# Patient Record
Sex: MALE | Race: WHITE | NOT HISPANIC OR LATINO | Employment: OTHER | URBAN - METROPOLITAN AREA
[De-identification: names, ages, dates, MRNs, and addresses within clinical notes are randomized per-mention and may not be internally consistent; named-entity substitution may affect disease eponyms.]

---

## 2023-03-06 PROBLEM — N52.9 MALE ERECTILE DISORDER: Status: ACTIVE | Noted: 2023-03-06

## 2023-03-06 PROBLEM — F32.A DEPRESSION: Status: ACTIVE | Noted: 2023-03-06

## 2023-03-06 PROBLEM — L72.3 SEBACEOUS CYST: Status: ACTIVE | Noted: 2023-03-06

## 2023-03-06 PROBLEM — D00.07 CARCINOMA IN SITU OF TONGUE: Status: ACTIVE | Noted: 2023-03-06

## 2023-03-06 PROBLEM — S99.922S FOOT INJURY, LEFT, SEQUELA: Status: ACTIVE | Noted: 2023-03-06

## 2023-03-06 PROBLEM — M25.562 KNEE PAIN, LEFT: Status: ACTIVE | Noted: 2023-03-06

## 2023-03-06 PROBLEM — S76.011A STRAIN OF RIGHT HIP: Status: ACTIVE | Noted: 2023-03-06

## 2023-03-06 PROBLEM — G47.9 SLEEP DISORDER: Status: ACTIVE | Noted: 2023-03-06

## 2023-03-06 PROBLEM — K13.4: Status: ACTIVE | Noted: 2023-03-06

## 2023-03-06 PROBLEM — E66.3 OVERWEIGHT: Status: ACTIVE | Noted: 2023-03-06

## 2023-03-06 PROBLEM — F90.9 ADHD (ATTENTION DEFICIT HYPERACTIVITY DISORDER): Status: ACTIVE | Noted: 2023-03-06

## 2023-03-06 PROBLEM — M25.559 JOINT PAIN, HIP: Status: ACTIVE | Noted: 2023-03-06

## 2023-03-06 PROBLEM — L82.1 SEBORRHEIC KERATOSES: Status: ACTIVE | Noted: 2023-03-06

## 2023-03-06 PROBLEM — K13.21 ORAL LEUKOPLAKIA: Status: ACTIVE | Noted: 2023-03-06

## 2023-03-06 PROBLEM — R03.0 BLOOD PRESSURE ELEVATED WITHOUT HISTORY OF HTN: Status: ACTIVE | Noted: 2023-03-06

## 2023-03-06 PROBLEM — M16.10 PRIMARY LOCALIZED OSTEOARTHROSIS OF THE HIP: Status: ACTIVE | Noted: 2023-03-06

## 2023-03-06 PROBLEM — S83.249A MEDIAL MENISCUS TEAR: Status: ACTIVE | Noted: 2023-03-06

## 2023-03-06 PROBLEM — E78.49 ESSENTIAL FAMILIAL HYPERLIPIDEMIA: Status: ACTIVE | Noted: 2023-03-06

## 2023-03-06 PROBLEM — M23.8X2 OTHER INTERNAL DERANGEMENTS OF LEFT KNEE: Status: ACTIVE | Noted: 2023-03-06

## 2023-03-06 PROBLEM — M25.462 EFFUSION OF LEFT KNEE: Status: ACTIVE | Noted: 2023-03-06

## 2023-03-06 PROBLEM — G47.00 INSOMNIA: Status: ACTIVE | Noted: 2023-03-06

## 2023-03-06 PROBLEM — S93.402A LEFT ANKLE SPRAIN: Status: ACTIVE | Noted: 2023-03-06

## 2023-03-06 PROBLEM — N40.0 BPH (BENIGN PROSTATIC HYPERPLASIA): Status: ACTIVE | Noted: 2023-03-06

## 2023-03-06 PROBLEM — M17.12 ARTHRITIS OF KNEE, LEFT: Status: ACTIVE | Noted: 2023-03-06

## 2023-03-06 RX ORDER — POLYETHYLENE GLYCOL 3350, SODIUM CHLORIDE, SODIUM BICARBONATE, POTASSIUM CHLORIDE 420; 11.2; 5.72; 1.48 G/4L; G/4L; G/4L; G/4L
POWDER, FOR SOLUTION ORAL
COMMUNITY
Start: 2022-01-28

## 2023-03-06 RX ORDER — VALACYCLOVIR HYDROCHLORIDE 1 G/1
1 TABLET, FILM COATED ORAL DAILY
COMMUNITY
Start: 2020-03-03

## 2023-03-06 RX ORDER — CLOTRIMAZOLE 10 MG/1
LOZENGE ORAL; TOPICAL
COMMUNITY
Start: 2016-05-09

## 2023-03-06 RX ORDER — DOCUSATE SODIUM 100 MG/1
1 CAPSULE, LIQUID FILLED ORAL 2 TIMES DAILY
COMMUNITY
Start: 2016-07-11

## 2023-03-06 RX ORDER — DOXEPIN HYDROCHLORIDE 10 MG/ML
5 SOLUTION ORAL NIGHTLY
COMMUNITY
Start: 2015-07-01

## 2023-03-06 RX ORDER — CHLORHEXIDINE GLUCONATE ORAL RINSE 1.2 MG/ML
SOLUTION DENTAL
COMMUNITY
Start: 2016-07-23

## 2023-03-06 RX ORDER — METFORMIN HYDROCHLORIDE 500 MG/1
1 TABLET ORAL DAILY
COMMUNITY
Start: 2021-01-28

## 2023-03-06 RX ORDER — MULTIVITAMIN
TABLET ORAL
COMMUNITY
Start: 2016-03-02

## 2023-03-06 RX ORDER — MELOXICAM 15 MG/1
TABLET ORAL
COMMUNITY
Start: 2020-03-03

## 2023-03-06 RX ORDER — DEXTROAMPHETAMINE SACCHARATE, AMPHETAMINE ASPARTATE MONOHYDRATE, DEXTROAMPHETAMINE SULFATE, AMPHETAMINE SULFATE 6.25; 6.25; 6.25; 6.25 MG/1; MG/1; MG/1; MG/1
25 CAPSULE, EXTENDED RELEASE ORAL DAILY
COMMUNITY
Start: 2017-12-19

## 2023-03-06 RX ORDER — ASPIRIN 81 MG/1
81 TABLET ORAL
COMMUNITY
Start: 2013-11-18

## 2023-03-06 RX ORDER — LIDOCAINE HYDROCHLORIDE 20 MG/ML
SOLUTION OROPHARYNGEAL
COMMUNITY
Start: 2016-07-23

## 2023-03-08 ENCOUNTER — LAB (OUTPATIENT)
Dept: LAB | Facility: LAB | Age: 73
End: 2023-03-08
Payer: MEDICARE

## 2023-03-08 ENCOUNTER — OFFICE VISIT (OUTPATIENT)
Dept: PRIMARY CARE | Facility: CLINIC | Age: 73
End: 2023-03-08
Payer: MEDICARE

## 2023-03-08 VITALS — WEIGHT: 184 LBS | SYSTOLIC BLOOD PRESSURE: 123 MMHG | DIASTOLIC BLOOD PRESSURE: 69 MMHG | BODY MASS INDEX: 26.4 KG/M2

## 2023-03-08 DIAGNOSIS — F51.01 PRIMARY INSOMNIA: ICD-10-CM

## 2023-03-08 DIAGNOSIS — N40.0 BENIGN PROSTATIC HYPERPLASIA WITHOUT LOWER URINARY TRACT SYMPTOMS: ICD-10-CM

## 2023-03-08 DIAGNOSIS — Z00.00 HEALTH CARE MAINTENANCE: Primary | ICD-10-CM

## 2023-03-08 DIAGNOSIS — G89.29 CHRONIC LEFT SHOULDER PAIN: ICD-10-CM

## 2023-03-08 DIAGNOSIS — E78.49 ESSENTIAL FAMILIAL HYPERLIPIDEMIA: ICD-10-CM

## 2023-03-08 DIAGNOSIS — M25.512 CHRONIC LEFT SHOULDER PAIN: ICD-10-CM

## 2023-03-08 DIAGNOSIS — Z12.11 ENCOUNTER FOR SCREENING FOR MALIGNANT NEOPLASM OF COLON: ICD-10-CM

## 2023-03-08 DIAGNOSIS — Z00.00 HEALTH CARE MAINTENANCE: ICD-10-CM

## 2023-03-08 LAB
ALANINE AMINOTRANSFERASE (SGPT) (U/L) IN SER/PLAS: 38 U/L (ref 10–52)
ALBUMIN (G/DL) IN SER/PLAS: 4.3 G/DL (ref 3.4–5)
ALKALINE PHOSPHATASE (U/L) IN SER/PLAS: 56 U/L (ref 33–136)
ANION GAP IN SER/PLAS: 13 MMOL/L (ref 10–20)
ASPARTATE AMINOTRANSFERASE (SGOT) (U/L) IN SER/PLAS: 26 U/L (ref 9–39)
BILIRUBIN TOTAL (MG/DL) IN SER/PLAS: 0.4 MG/DL (ref 0–1.2)
CALCIUM (MG/DL) IN SER/PLAS: 9.2 MG/DL (ref 8.6–10.6)
CARBON DIOXIDE, TOTAL (MMOL/L) IN SER/PLAS: 27 MMOL/L (ref 21–32)
CHLORIDE (MMOL/L) IN SER/PLAS: 107 MMOL/L (ref 98–107)
CHOLESTEROL (MG/DL) IN SER/PLAS: 186 MG/DL (ref 0–199)
CHOLESTEROL IN HDL (MG/DL) IN SER/PLAS: 34.6 MG/DL
CHOLESTEROL/HDL RATIO: 5.4
CREATININE (MG/DL) IN SER/PLAS: 0.9 MG/DL (ref 0.5–1.3)
ERYTHROCYTE DISTRIBUTION WIDTH (RATIO) BY AUTOMATED COUNT: 12.7 % (ref 11.5–14.5)
ERYTHROCYTE MEAN CORPUSCULAR HEMOGLOBIN CONCENTRATION (G/DL) BY AUTOMATED: 33.2 G/DL (ref 32–36)
ERYTHROCYTE MEAN CORPUSCULAR VOLUME (FL) BY AUTOMATED COUNT: 91 FL (ref 80–100)
ERYTHROCYTES (10*6/UL) IN BLOOD BY AUTOMATED COUNT: 4.97 X10E12/L (ref 4.5–5.9)
GFR MALE: >90 ML/MIN/1.73M2
GLUCOSE (MG/DL) IN SER/PLAS: 87 MG/DL (ref 74–99)
HEMATOCRIT (%) IN BLOOD BY AUTOMATED COUNT: 45.2 % (ref 41–52)
HEMOGLOBIN (G/DL) IN BLOOD: 15 G/DL (ref 13.5–17.5)
LDL: 109 MG/DL (ref 0–99)
LEUKOCYTES (10*3/UL) IN BLOOD BY AUTOMATED COUNT: 5.2 X10E9/L (ref 4.4–11.3)
NON HDL CHOLESTEROL: 151 MG/DL
NRBC (PER 100 WBCS) BY AUTOMATED COUNT: 0 /100 WBC (ref 0–0)
PLATELETS (10*3/UL) IN BLOOD AUTOMATED COUNT: 223 X10E9/L (ref 150–450)
POTASSIUM (MMOL/L) IN SER/PLAS: 4.6 MMOL/L (ref 3.5–5.3)
PROSTATE SPECIFIC ANTIGEN,SCREEN: 1.58 NG/ML (ref 0–4)
PROTEIN TOTAL: 6.6 G/DL (ref 6.4–8.2)
SODIUM (MMOL/L) IN SER/PLAS: 142 MMOL/L (ref 136–145)
TRIGLYCERIDE (MG/DL) IN SER/PLAS: 210 MG/DL (ref 0–149)
UREA NITROGEN (MG/DL) IN SER/PLAS: 23 MG/DL (ref 6–23)
VLDL: 42 MG/DL (ref 0–40)

## 2023-03-08 PROCEDURE — G0439 PPPS, SUBSEQ VISIT: HCPCS | Performed by: INTERNAL MEDICINE

## 2023-03-08 PROCEDURE — 85027 COMPLETE CBC AUTOMATED: CPT

## 2023-03-08 PROCEDURE — 1036F TOBACCO NON-USER: CPT | Performed by: INTERNAL MEDICINE

## 2023-03-08 PROCEDURE — 99214 OFFICE O/P EST MOD 30 MIN: CPT | Performed by: INTERNAL MEDICINE

## 2023-03-08 PROCEDURE — 93000 ELECTROCARDIOGRAM COMPLETE: CPT | Performed by: INTERNAL MEDICINE

## 2023-03-08 PROCEDURE — 80053 COMPREHEN METABOLIC PANEL: CPT

## 2023-03-08 PROCEDURE — 80061 LIPID PANEL: CPT

## 2023-03-08 PROCEDURE — 84153 ASSAY OF PSA TOTAL: CPT

## 2023-03-08 PROCEDURE — 36415 COLL VENOUS BLD VENIPUNCTURE: CPT

## 2023-03-08 NOTE — PROGRESS NOTES
Subjective   Patient ID: Nelson Hope is a 72 y.o. male who presents for No chief complaint on file..    HPI CPE see updated front sheet no chest pain no shortness of breath no side effect with medication uses a small amount of doxepin for sleeping at night discussed with prostate health discussed with: Health Brother had recent diagnosis of low-grade prostate cancer some left shoulder pain bowels normal no dysuria bones muscles joints otherwise okay    Medicare wellness: There is no preformed questionnaire available tobacco assessed alcohol assessed drug use cyst no reports of cognitive impairment ADLs all independent IADLs all independent exercises patient care team noted no falls not depressed patient screen no hearing aids      Past medical history insomnia hyperlipidemia    Medication doxepin    Allergies noted and unchanged    Social history no tobacco    Family history Brother prostate cancer no colon cancer in the family    Prevention exercises some prior blood work reviewed    Depression screen not depressed    Review of Systems    Objective   There were no vitals taken for this visit.    Physical Exam vital signs noted alert and oriented x3 NCAT PERRLA EOMI nares without discharge OP benign TM normal bilateral EAC clear bilateral no AC nodes no JVD or bruit no thyromegaly chest clear to auscultation and percussion CV regular rate and rhythm S1-S2 without murmur gallop or rub abdomen soft nontender normal active bowel sounds no rebound or guarding no HSM LS spine normal curvature negative straight leg raise negative logroll negative SI joint tenderness extremities no clubbing cyanosis or edema normal distal pulses DTR 2+ skin small lipoma left scapula    Assessment/Plan        Impression General medical examination hyperlipidemia left shoulder pain BPH other diagnoses insomnia  Plan continue with doxepin and sleep hygiene left shoulder with full range of motion no crepitus desires to have physical therapy  okay requisition made check EKG rule out LVH advised on blood pressure blood pressure medicine although blood pressure is okay today check Chem-7 advised on glucose potassium and kidney function check CBC advised on blood count check lipid panel advised on cholesterol profile okay for colonoscopy requisition made check PSA advised on prostate good diet regular exercise increase water consumption water consumption evaluation towards the evening time recheck based on above TT 60 cc 31

## 2024-01-26 ENCOUNTER — TELEPHONE (OUTPATIENT)
Dept: PRIMARY CARE | Facility: CLINIC | Age: 74
End: 2024-01-26

## 2024-01-27 DIAGNOSIS — H91.90 HEARING LOSS, UNSPECIFIED HEARING LOSS TYPE, UNSPECIFIED LATERALITY: Primary | ICD-10-CM

## 2024-02-22 ENCOUNTER — TELEPHONE (OUTPATIENT)
Dept: ORTHOPEDIC SURGERY | Facility: CLINIC | Age: 74
End: 2024-02-22
Payer: MEDICARE

## 2024-02-22 DIAGNOSIS — G89.29 CHRONIC PAIN OF RIGHT KNEE: Primary | ICD-10-CM

## 2024-02-22 DIAGNOSIS — M25.561 CHRONIC PAIN OF RIGHT KNEE: Primary | ICD-10-CM

## 2024-02-22 DIAGNOSIS — S86.111S GASTROCNEMIUS STRAIN, RIGHT, SEQUELA: ICD-10-CM

## 2024-02-22 NOTE — TELEPHONE ENCOUNTER
pt of dr Staton is requesting Physical Therapy order for rt knee pt stated still having pain, 2-22 sm

## 2024-02-28 ENCOUNTER — CLINICAL SUPPORT (OUTPATIENT)
Dept: AUDIOLOGY | Facility: CLINIC | Age: 74
End: 2024-02-28
Payer: MEDICARE

## 2024-02-28 DIAGNOSIS — H90.3 ASYMMETRIC SNHL (SENSORINEURAL HEARING LOSS): ICD-10-CM

## 2024-02-28 PROCEDURE — 92550 TYMPANOMETRY & REFLEX THRESH: CPT | Performed by: AUDIOLOGIST

## 2024-02-28 PROCEDURE — 92557 COMPREHENSIVE HEARING TEST: CPT | Performed by: AUDIOLOGIST

## 2024-02-28 NOTE — PROGRESS NOTES
"  ADULT AUDIOMETRIC EVALUATION    Name:  Nelson Hope  :  1950  Age:  73 y.o.  Date of Evaluation:  2024    HISTORY:  Reason for visit: Mr. Hope is seen today for an evaluation of hearing. Patient was unaccompanied. He was referred by Duc Haney MD.    Patient reports concern for hearing decrease, left ear greater than right ear. He reports his wife is noticing as well. Occasionally, he will experience sounds of hearing his breathing in the left ear which he finds to be annoying. He has history of some recreational noise exposure (loud music, concerts).     Denies: history of otologic surgery, otalgia, otorrhea, and tinnitus    Hearing Aid History: Patient does not have hearing aids at this time.    EVALUATION:    See Audiogram and Immittance results under \"Media\".    RESULTS:     Otoscopic Evaluation:     RIGHT  Clear canal with tympanic membrane visualized    LEFT  Clear canal with tympanic membrane visualized    Immittance:   Immittance Measures: 226 Hz          Right Ear: Type A: Normal middle ear function         Left Ear:  Type A: Normal middle ear function    Reflexes and Reflex Decay:    Ipsilateral Reflexes (500-4000 Hz):          Probe/Stimulus Right Ear: present       Probe/Stimulus Left Ear: present 1000 and 2000 Hz; artifact at 500 and 4000 Hz    Otoacoustic Emissions [DP(OAEs)]:  Right Ear: DPOAEs present at 5973-7961 Hz, 5000 Hz. Absent at 4685-5649 Hz, 6170-5561 Hz.   Left Ear: DPOAEs present at 4616-0699 Hz, 5000 Hz and 8000 Hz. Absent at 4000 and 6000 Hz.         Audiometry:  Test Technique: Standard Audiometry under insert phones.    Reliability: Good     Pure Tone Audiometry:    Right: Hearing levels within normal limits 125-1500 Hz falling to a mild sloping to moderately-severe sensorineural hearing loss 2795-0413 Hz   Left: Hearing levels within normal limits 125-1000 Hz falling to a mild sloping to severe sensorineural hearing loss 2918-8333 Hz   Asymmetry noted, " left ear worse, from 3672-8050 Hz.    Speech Audiometry (via recorded, 25-words unless noted; M=masked):       Right Ear: Speech Reception Threshold (SRT) was obtained at 10 dBHL  Word Recognition Scores were Good (84%) in quiet when words were presented at 50 dBHL, using the NU-6 2A word list.  Left Ear: Speech Reception Threshold (SRT) was obtained at 20 dBHL  Word Recognition Scores were Good (80%) in quiet when words were presented at 60 dBHL, using the NU-6 3A word list.    IMPRESSIONS:  Today's test results suggest normal middle ear function.     Right: Hearing levels within normal limits 125-1500 Hz falling to a mild sloping to moderately-severe sensorineural hearing loss 5677-6228 Hz with good word understanding.   Left: Hearing levels within normal limits 125-1000 Hz falling to a mild sloping to severe sensorineural hearing loss 9117-1860 Hz with good word understanding.   Asymmetry noted, left ear worse, from 0596-2120 Hz.    PATIENT EDUCATION:   Nelson Hope was counseled with regard to the findings. Evaluation with ENT was recommended due to asymmetry at 8178-6588 Hz, left ear worse. Patient reports interest in hearing aids.       PLAN:  Follow up with Duc Haney MD as directed.  Retest hearing annually; sooner if concerns or changes arise.  Flex Trial scheduled for 3/13/24 at 3:00. Patient signed the Waiver of Medical Evaluation for Hearing Aid.      Delon Jones, CCC-A  Clinical Audiologist    Time: 0464-5333    This note was created using speech recognition transcription software. Despite proofreading, several typographical errors might be present that might affect the meaning of the content. Please call with any questions.     Degree of   Hearing Sensitivity dB Range   Within Normal Limits (WNL) 0 - 20   Slight 25   Mild 26 - 40   Moderate 41 - 55   Moderately-Severe 56 - 70   Severe 71 - 90   Profound 91 +     KEY  TM Tympanic Membrane   WNL Within Normal Limits   HA Hearing Aid   SNHL  Sensorineural Hearing Loss   CHL Conductive Hearing Loss   NIHL Noise-Induced Hearing Loss   ECV Ear Canal Volume

## 2024-03-13 ENCOUNTER — CLINICAL SUPPORT (OUTPATIENT)
Dept: AUDIOLOGY | Facility: CLINIC | Age: 74
End: 2024-03-13

## 2024-03-13 DIAGNOSIS — H90.3 ASYMMETRIC SNHL (SENSORINEURAL HEARING LOSS): Primary | ICD-10-CM

## 2024-03-13 PROCEDURE — 92700 UNLISTED ORL SERVICE/PX: CPT | Performed by: AUDIOLOGIST

## 2024-03-13 NOTE — PROGRESS NOTES
"FLEX TRIAL HEARING AID FITTING    Name:  Nelson Hope  :  1950  Age:  73 y.o.    Date of Encounter:  3/13/2024  Time: 3955-5133    HISTORY     Mr. Hope is seen today for a fitting of Flex Trial binaural Phonak Audeo L90-R hearing aids. Most recent hearing evaluation on 2024 revealed normal hearing sloping to a moderately-severe sensorineural hearing loss in the right ear and normal hearing sloping to a severe sensorineural hearing loss in the left ear. Asymmetry noted, left ear worse, from 0813-8747 Hz. Patient signed the Waiver of Medical Evaluation for Hearing Aid.      IMPRESSIONS AND RECOMMENDATIONS      Fitting of binaural Flex Trial binaural Phonak Audeo L90-R hearing aids was completed today. The patient expressed satisfaction with the fit and sound of the hearing aids. The trial device agreement was reviewed and signed (see scanned documents in \"Media\" tab). Patient was counseled regarding the conditions of the Flex Trial period. Questions regarding hearing aid technology levels were addressed and the patient was encouraged to contact our department ([phone]) should questions or concerns arise.     TREATMENT PLAN     Continue use of binaural hearing aids.  Return for hearing aid check on 3/27/2024.  Annual hearing evaluations.    PROCEDURE     Hearing Aid Fitting: Otoscopy revealed clear ear canals and tympanic membrane visualized, bilaterally. Receivers were measured and coupled to the hearing aids. The large open domes were confirmed to fit appropriately in the ear. The hearing aids were programmed to the patient's hearing loss using the 's prescriptive fitting strategy and set to 80%. Feedback manager was completed. The patient initially did not notice a drastic different in audibility. The patient also noticed the right hearing aid was louder than the left hearing aid. Prescriptive fitting strategy and set to 80% and the left hearing aid overall gain was increased 6 steps. " The expressed satisfaction with the fit and sound loudness and quality of the hearing aids. He reported his voice is loud. Patient was counseled on the occlusion effect and that consistent use will encouraged habituation. Low-battery and volume control signals were demonstrated for the patient. The hearing aids were paired to the patient's phone. Hearing aid care, maintenance, and charging were reviewed. The patient demonstrated ability to insert/remove the hearing aids and use of volume control. Hearing aid limitations were discussed at length as well as realistic expectations. The patient was advised in order to receive full benefit of amplification, consistent use during all waking hours is recommended. Hearing aids should not be submerged in water/liquid or stored in a high-moisture location (i.e. bathroom).    Verification: Hearing aid verification was completed using test box real ear measures (REM) with the 's prescriptive fitting strategy and set to 80%. REM indicated the hearing aids are meeting or exceeding NAL-N2 prescriptive at 250-1500 Hz for soft (55 dB), average (65 dB), and loud (75dB) speech (see scanned documents).       Completed by:    BILLY Ortega.  Doctor of Audiology Extern     Delon Jones, [facility]-A  Licensed Audiologist

## 2024-03-26 NOTE — PROGRESS NOTES
FLEX TRIAL HEARING AID CHECK    Name:  Nelson Hope  :  1950  Age:  73 y.o.    Date of Encounter: 3/27/2024  Time: 5843-7104    HEARING AID CHECK     Mr. Hope is seen today for a check of Flex Trial binaural Phonak Audeo L90-R hearing aids. Today, Mr. Hope reports satisfaction with the hearing aid trial, however, he would like to research other options (I.e. [employer]) prior to purchasing. Information regarding hearing aid technology, pricing, and purchase warranties were reviewed. Mr. Hope reported he may call to order a pair of hearing aids should he not be satisfied with alternative options. Should he proceed with ordering through , he selected to order two Phonak Audeo L90-R hearing aids in the color sand beige.     FLEX trial hearing aids were returned in good working condition.     TREATMENT PLAN     Pending patient motivation, place hearing aid order by calling [phone] or by direct messaging Delon Bass via [employer].  Annual hearing evaluations - due 2024    PROCEDURE       Completed by:    BILLY Ortega.  Doctor of Audiology Extern     Delon Jones, [facility]-A  Licensed Audiologist

## 2024-03-27 ENCOUNTER — CLINICAL SUPPORT (OUTPATIENT)
Dept: AUDIOLOGY | Facility: CLINIC | Age: 74
End: 2024-03-27

## 2024-03-27 DIAGNOSIS — H90.3 ASYMMETRIC SNHL (SENSORINEURAL HEARING LOSS): Primary | ICD-10-CM

## 2024-07-24 ENCOUNTER — APPOINTMENT (OUTPATIENT)
Dept: PRIMARY CARE | Facility: CLINIC | Age: 74
End: 2024-07-24
Payer: MEDICARE

## 2024-07-24 ENCOUNTER — LAB (OUTPATIENT)
Dept: LAB | Facility: LAB | Age: 74
End: 2024-07-24
Payer: MEDICARE

## 2024-07-24 VITALS
WEIGHT: 175 LBS | SYSTOLIC BLOOD PRESSURE: 117 MMHG | HEIGHT: 69 IN | DIASTOLIC BLOOD PRESSURE: 76 MMHG | BODY MASS INDEX: 25.92 KG/M2

## 2024-07-24 DIAGNOSIS — Z00.00 HEALTH CARE MAINTENANCE: Primary | ICD-10-CM

## 2024-07-24 DIAGNOSIS — E78.49 ESSENTIAL FAMILIAL HYPERLIPIDEMIA: ICD-10-CM

## 2024-07-24 DIAGNOSIS — M17.10 PRIMARY OSTEOARTHRITIS OF KNEE, UNSPECIFIED LATERALITY: ICD-10-CM

## 2024-07-24 DIAGNOSIS — N40.0 BENIGN PROSTATIC HYPERPLASIA WITHOUT LOWER URINARY TRACT SYMPTOMS: ICD-10-CM

## 2024-07-24 DIAGNOSIS — Z00.00 HEALTH CARE MAINTENANCE: ICD-10-CM

## 2024-07-24 DIAGNOSIS — R73.02 GLUCOSE INTOLERANCE (IMPAIRED GLUCOSE TOLERANCE): ICD-10-CM

## 2024-07-24 DIAGNOSIS — Z12.11 ENCOUNTER FOR SCREENING FOR MALIGNANT NEOPLASM OF COLON: ICD-10-CM

## 2024-07-24 PROCEDURE — 1160F RVW MEDS BY RX/DR IN RCRD: CPT | Performed by: INTERNAL MEDICINE

## 2024-07-24 PROCEDURE — 3008F BODY MASS INDEX DOCD: CPT | Performed by: INTERNAL MEDICINE

## 2024-07-24 PROCEDURE — 1170F FXNL STATUS ASSESSED: CPT | Performed by: INTERNAL MEDICINE

## 2024-07-24 PROCEDURE — G0439 PPPS, SUBSEQ VISIT: HCPCS | Performed by: INTERNAL MEDICINE

## 2024-07-24 PROCEDURE — 99214 OFFICE O/P EST MOD 30 MIN: CPT | Performed by: INTERNAL MEDICINE

## 2024-07-24 PROCEDURE — 1159F MED LIST DOCD IN RCRD: CPT | Performed by: INTERNAL MEDICINE

## 2024-07-24 PROCEDURE — 36415 COLL VENOUS BLD VENIPUNCTURE: CPT

## 2024-07-24 PROCEDURE — 1036F TOBACCO NON-USER: CPT | Performed by: INTERNAL MEDICINE

## 2024-07-24 RX ORDER — TAMSULOSIN HYDROCHLORIDE 0.4 MG/1
0.4 CAPSULE ORAL DAILY
Qty: 30 CAPSULE | Refills: 11 | Status: SHIPPED | OUTPATIENT
Start: 2024-07-24 | End: 2025-07-24

## 2024-07-24 NOTE — PROGRESS NOTES
Subjective   Patient ID: Nelson Hope is a 74 y.o. male who presents for No chief complaint on file..    HPI CPE see updated front sheet no chest pain no shortness of breath no polyuria polydipsia remains on metformin has lost some weight knees have been aching he arises at night up to 4 times now no side effect with medication      Past medical history insomnia hyperlipidemia    Medication doxepin metformin    Allergies noted and unchanged    Social history no tobacco    Family history Brother prostate cancer no colon cancer in the family    Prevention exercises some prior blood work reviewed has not had colonoscopy recently desires for Cologuard    Depression screen not depressed    Review of Systems    Objective   There were no vitals taken for this visit.    Physical Exam vital signs noted alert and oriented x3 NCAT PERRLA EOMI nares without discharge OP benign TM normal bilateral EAC clear bilateral no AC nodes no JVD or bruit no thyromegaly chest clear to auscultation and percussion no wheezing no crackles CV regular rate and rhythm S1-S2 without murmur gallop or rub abdomen soft nontender normal active bowel sounds no rebound or guarding no HSM LS spine normal curvature negative straight leg raise negative logroll negative SI joint tenderness extremities no clubbing cyanosis or edema normal distal pulses DTR 2+   Assessment/Plan        Impression General medical examination hyperlipidemia glucose intolerance DJD knees BPH other diagnoses   Plan also discussed with lipoprotein a okay for Cologuard requisition made check comprehensive panel pleasant glucose potassium and kidney function as well as liver function he continues with the metformin check A1c May follow-up with orthopedic surgery Dr. Baljinder Oliveira: For his knee is okay for trial of Flomax 0.4 mg p.o. nightly check PSA advised on prostate check lipid panel advised on cholesterol profile check CBC advised on blood count but overall diet regular exercise  increase water consumption Tylenol as needed for the knees fluid management at night recheck more regularly based on labs TT 50 cc 26

## 2024-07-25 LAB
ALBUMIN SERPL BCP-MCNC: 4.3 G/DL (ref 3.4–5)
ALP SERPL-CCNC: 56 U/L (ref 33–136)
ALT SERPL W P-5'-P-CCNC: 21 U/L (ref 10–52)
ANION GAP SERPL CALC-SCNC: 13 MMOL/L (ref 10–20)
AST SERPL W P-5'-P-CCNC: 30 U/L (ref 9–39)
BILIRUB SERPL-MCNC: 0.7 MG/DL (ref 0–1.2)
BUN SERPL-MCNC: 25 MG/DL (ref 6–23)
CALCIUM SERPL-MCNC: 9.7 MG/DL (ref 8.6–10.6)
CHLORIDE SERPL-SCNC: 103 MMOL/L (ref 98–107)
CHOLEST SERPL-MCNC: 133 MG/DL (ref 0–199)
CHOLESTEROL/HDL RATIO: 3.2
CO2 SERPL-SCNC: 28 MMOL/L (ref 21–32)
CREAT SERPL-MCNC: 0.98 MG/DL (ref 0.5–1.3)
EGFRCR SERPLBLD CKD-EPI 2021: 81 ML/MIN/1.73M*2
ERYTHROCYTE [DISTWIDTH] IN BLOOD BY AUTOMATED COUNT: 13.2 % (ref 11.5–14.5)
EST. AVERAGE GLUCOSE BLD GHB EST-MCNC: 111 MG/DL
GLUCOSE SERPL-MCNC: 65 MG/DL (ref 74–99)
HBA1C MFR BLD: 5.5 %
HCT VFR BLD AUTO: 47.6 % (ref 41–52)
HDLC SERPL-MCNC: 41.2 MG/DL
HGB BLD-MCNC: 15.7 G/DL (ref 13.5–17.5)
LDLC SERPL CALC-MCNC: 80 MG/DL
MCH RBC QN AUTO: 30.7 PG (ref 26–34)
MCHC RBC AUTO-ENTMCNC: 33 G/DL (ref 32–36)
MCV RBC AUTO: 93 FL (ref 80–100)
NON HDL CHOLESTEROL: 92 MG/DL (ref 0–149)
NRBC BLD-RTO: 0 /100 WBCS (ref 0–0)
PLATELET # BLD AUTO: 159 X10*3/UL (ref 150–450)
POTASSIUM SERPL-SCNC: 5.1 MMOL/L (ref 3.5–5.3)
PROT SERPL-MCNC: 7 G/DL (ref 6.4–8.2)
PSA SERPL-MCNC: 1.64 NG/ML
RBC # BLD AUTO: 5.12 X10*6/UL (ref 4.5–5.9)
SODIUM SERPL-SCNC: 139 MMOL/L (ref 136–145)
TRIGL SERPL-MCNC: 61 MG/DL (ref 0–149)
VLDL: 12 MG/DL (ref 0–40)
WBC # BLD AUTO: 5 X10*3/UL (ref 4.4–11.3)

## 2024-07-28 ASSESSMENT — ENCOUNTER SYMPTOMS
DEPRESSION: 0
LOSS OF SENSATION IN FEET: 0
OCCASIONAL FEELINGS OF UNSTEADINESS: 0

## 2024-07-28 ASSESSMENT — ACTIVITIES OF DAILY LIVING (ADL)
DRESSING: INDEPENDENT
TAKING_MEDICATION: INDEPENDENT
MANAGING_FINANCES: INDEPENDENT
BATHING: INDEPENDENT
GROCERY_SHOPPING: INDEPENDENT
DOING_HOUSEWORK: INDEPENDENT

## 2024-07-28 ASSESSMENT — PATIENT HEALTH QUESTIONNAIRE - PHQ9
1. LITTLE INTEREST OR PLEASURE IN DOING THINGS: NOT AT ALL
SUM OF ALL RESPONSES TO PHQ9 QUESTIONS 1 AND 2: 0
2. FEELING DOWN, DEPRESSED OR HOPELESS: NOT AT ALL

## 2024-08-05 LAB — NONINV COLON CA DNA+OCC BLD SCRN STL QL: NORMAL

## 2024-08-12 LAB — NONINV COLON CA DNA+OCC BLD SCRN STL QL: NEGATIVE

## 2024-10-01 ENCOUNTER — OFFICE VISIT (OUTPATIENT)
Dept: ORTHOPEDIC SURGERY | Facility: CLINIC | Age: 74
End: 2024-10-01
Payer: COMMERCIAL

## 2024-10-01 DIAGNOSIS — M17.0 BILATERAL PRIMARY OSTEOARTHRITIS OF KNEE: Primary | ICD-10-CM

## 2024-10-01 PROCEDURE — 1036F TOBACCO NON-USER: CPT | Performed by: STUDENT IN AN ORGANIZED HEALTH CARE EDUCATION/TRAINING PROGRAM

## 2024-10-01 PROCEDURE — 99214 OFFICE O/P EST MOD 30 MIN: CPT | Performed by: STUDENT IN AN ORGANIZED HEALTH CARE EDUCATION/TRAINING PROGRAM

## 2024-10-01 PROCEDURE — 99204 OFFICE O/P NEW MOD 45 MIN: CPT | Performed by: STUDENT IN AN ORGANIZED HEALTH CARE EDUCATION/TRAINING PROGRAM

## 2024-10-01 PROCEDURE — 1159F MED LIST DOCD IN RCRD: CPT | Performed by: STUDENT IN AN ORGANIZED HEALTH CARE EDUCATION/TRAINING PROGRAM

## 2024-10-01 NOTE — PROGRESS NOTES
PRIMARY CARE PHYSICIAN: Duc Haney MD  REFERRING PROVIDER: No referring provider defined for this encounter.       SUBJECTIVE  CHIEF COMPLAINT:   Chief Complaint   Patient presents with    Right Knee - New Patient Visit        HPI: Nelson Hope is a pleasant 74 y.o. year-old male who is seen today for evaluation of bilateral knee pain.  The right knee is more symptomatic than the left.  He has had some discomfort in the knee for about a year.  He noticed it while playing tennis.  Last time he played tennis, he had to hobble off the court as he felt that there was some pain and swelling in the knee after playing.  He did notice some instability as well.  He has been exercising and stretching more since that time.  However, he has refrained from playing tennis given his symptoms.  Used to have difficulty going downstairs but now is able to go up and down without much difficulty.  He has not tried intra-articular injections to this point.  He does not use an assistive device or brace.    REVIEW OF SYSTEMS  The patient denies any fever, chills, chest pain, shortness of breath or difficulty breathing.  Patient denies any numbness, tingling, or radicular symptoms.  Adult patient history sheet was filled out by the patient today in clinic and will be scanned into the EMR.  I personally reviewed this form which will be scanned into the EMR.  This includes Past Medical History, Past Surgical History, Medications, Allergies, Social History, Family History and 12 point review of systems.    Past Medical History:   Diagnosis Date    Elevated prostate specific antigen (PSA)     Elevated prostate specific antigen (PSA)    Lateral epicondylitis, unspecified elbow 07/20/2013    Lateral epicondylitis    Pain in unspecified shoulder 07/20/2013    Pain, joint, shoulder    Personal history of malignant melanoma of skin     History of malignant melanoma    Sleep disorder, unspecified     Sleep disturbances        No Known  Allergies     Past Surgical History:   Procedure Laterality Date    COLONOSCOPY  12/17/2019    Complete Colonoscopy    HERNIA REPAIR  03/02/2016    Hernia Repair    OTHER SURGICAL HISTORY  08/01/2014    Biopsy Skin    VASECTOMY  07/01/2015    Surgery Vas Deferens Vasectomy        Family History   Problem Relation Name Age of Onset    Lung cancer Mother      Other (bladder cancer) Father      Other (diabetes mellitus) Paternal Grandfather      Other (diabetes mellitus) Other          Social History     Socioeconomic History    Marital status:      Spouse name: Not on file    Number of children: Not on file    Years of education: Not on file    Highest education level: Not on file   Occupational History    Not on file   Tobacco Use    Smoking status: Never    Smokeless tobacco: Never   Substance and Sexual Activity    Alcohol use: Not Currently    Drug use: Never    Sexual activity: Not on file   Other Topics Concern    Not on file   Social History Narrative    Not on file     Social Determinants of Health     Financial Resource Strain: Not on file   Food Insecurity: Not on file   Transportation Needs: Not on file   Physical Activity: Not on file   Stress: Not on file   Social Connections: Not on file   Intimate Partner Violence: Not on file   Housing Stability: Not on file        CURRENT MEDICATIONS:   Current Outpatient Medications   Medication Sig Dispense Refill    amphetamine-dextroamphetamine XR (Mydayis) 25 mg 24 hr capsule Take 1 capsule (25 mg) by mouth once daily.      aspirin 81 mg EC tablet Take 1 tablet (81 mg) by mouth.      chlorhexidine (Peridex) 0.12 % solution Take by mouth.      clotrimazole (Mycelex) 10 mg meliton Take by mouth.      docusate sodium (Colace) 100 mg capsule Take 1 capsule (100 mg) by mouth in the morning and 1 capsule (100 mg) before bedtime.      doxepin (SINEquan) 10 mg/mL solution Take 0.5 mL (5 mg) by mouth once daily at bedtime.      lidocaine (Xylocaine) 2 % solution  Take by mouth.      meloxicam (Mobic) 15 mg tablet Take by mouth.      metFORMIN (Glucophage) 500 mg tablet Take 1 tablet (500 mg) by mouth once daily.      multivitamin tablet Take by mouth.      NON FORMULARY BMX solution (1:1:1 ratio) Sig: 10 ml swish gargle spit q. 4-6 hours PRN pain      polyethylene glycol-electrolytes 420 gram solution Take by mouth. MIX AND DRINK 4000 ML AS PER SPLIT DOSE  ENDOSCOPY INSTRUCTIONS      tamsulosin (Flomax) 0.4 mg 24 hr capsule Take 1 capsule (0.4 mg) by mouth once daily. 30 capsule 11    valACYclovir (Valtrex) 1 gram tablet Take 1 tablet (1,000 mg) by mouth once daily.       No current facility-administered medications for this visit.        OBJECTIVE    PHYSICAL EXAM  There is no height or weight on file to calculate BMI.    General: Well-appearing male in no acute distress.  Awake, alert and oriented.  Pleasant and cooperative.  Respiratory: Non-labored breathing  Mood: Euthymic   Gait: Normal  Assistive Device: None     Affected Right Knee  Limb Alignment: Mild varus about 5 degrees  ROM: 5-120  Stable to varus and valgus stress at full extension and 30 degrees of flexion  Skin: Intact, no abrasions or draining sinuses  Effusion: None  Quad Strength: 5/5  Hamstring Strength: 5/5  Patella Crepitus: None  Patella Grind: Negative   Tenderness: None  Sensation: Intact to light touch distally  Motor function: Able to fire TA, EHL, G/S  Pulses: Palpable DP pulse    Unaffected Left Knee  Limb Alignment: Mild varus about 5 degrees  ROM: 5-120  Stable to varus and valgus stress at full extension and 30 degrees of flexion  Skin: Intact, no abrasions or draining sinuses  Effusion: None  Quad Strength: 5/5  Hamstring Strength: 5/5  Patella Crepitus: None  Patella Grind: Negative   Tenderness: None  Sensation: Intact to light touch distally  Motor function: Able to fire TA, EHL, G/S  Pulses: Palpable DP pulse    IMAGING:  AP / lateral/ mid-flexion/sunrise views: Independent review of  right knee x-rays was performed. The findings were reviewed with the patient. There are mild degenerative changes of the right knee with varus limb alignment. There is not joint space narrowing, subchondral sclerosis, and osteophyte formation. No evidence of fracture, AVN, dislocation, osteomyelitis.        ASSESSMENT & PLAN    IMPRESSION:  Nelson Hope is a 74 y.o. male with mild osteoarthritis of both knees.    PLAN:  I had an in-depth discussion about the nature and natural history of degenerative joint disease.  I explained that it is progressive, but there is no way to predict how quickly a patient's symptoms will get worse.  I advised that the patient that they can manage their pain symptomatically, with 3-5 day courses of nonsteroidal anti-inflammatories and activity modification as needed when there is a a flare up. I explained to Nelson Hope that the best interventions they can take to perhaps slow the progression of the degenerative changes in the long-term are maintaining a healthy weight and performing low impact exercise such as cycling, walking, and swimming. The use of a walking stick, cane or other assistive device can help as well.     I also discussed more invasive treatment options including injections and radiofrequency nerve ablation. I talked about the risks and benefits of injections, focusing on the fact that there is no way to predict the degree or duration of symptom relief, but that it can provide weeks to months of pain relief in most patients. Should these measures fail, the most invasive option would be joint replacement surgery. The patient should try and delay joint replacement surgery for as long as possible. If the patients' joint problem affects their quality of life and cause significant restrictions of their activities, they may want to consider joint replacement surgery. I briefly covered the risks, benefits and expected recovery after total joint arthroplasty.    Nelson EDWARDS  Jayy may one day benefit from an elective total joint replacement however has not yet exhausted other treatment options. I have recommended:  1. Intra-articular injections.  The patient would like to wait until after the November elections to move forward with the intra-articular injections.  2.  Knee sleeves with activity.    Follow-up in November for intra-articular injections.    All of the patient's questions were answered and he was comfortable with this plan of care.  Nelson Hope was encouraged to call if any problems, questions or concerns arise.     * This office note was dictated using Dragon voice to text software and was not proofread for spelling or grammatical errors *

## 2024-10-16 ENCOUNTER — APPOINTMENT (OUTPATIENT)
Dept: ORTHOPEDIC SURGERY | Facility: CLINIC | Age: 74
End: 2024-10-16
Payer: MEDICARE

## 2024-10-16 DIAGNOSIS — M17.11 PRIMARY OSTEOARTHRITIS OF RIGHT KNEE: Primary | ICD-10-CM

## 2024-10-16 PROCEDURE — 20610 DRAIN/INJ JOINT/BURSA W/O US: CPT | Mod: RT | Performed by: STUDENT IN AN ORGANIZED HEALTH CARE EDUCATION/TRAINING PROGRAM

## 2024-10-16 PROCEDURE — 99212 OFFICE O/P EST SF 10 MIN: CPT | Performed by: STUDENT IN AN ORGANIZED HEALTH CARE EDUCATION/TRAINING PROGRAM

## 2024-10-16 PROCEDURE — 2500000004 HC RX 250 GENERAL PHARMACY W/ HCPCS (ALT 636 FOR OP/ED): Performed by: STUDENT IN AN ORGANIZED HEALTH CARE EDUCATION/TRAINING PROGRAM

## 2024-10-16 PROCEDURE — 1036F TOBACCO NON-USER: CPT | Performed by: STUDENT IN AN ORGANIZED HEALTH CARE EDUCATION/TRAINING PROGRAM

## 2024-10-16 PROCEDURE — 1159F MED LIST DOCD IN RCRD: CPT | Performed by: STUDENT IN AN ORGANIZED HEALTH CARE EDUCATION/TRAINING PROGRAM

## 2024-10-16 RX ORDER — TRIAMCINOLONE ACETONIDE 40 MG/ML
1 INJECTION, SUSPENSION INTRA-ARTICULAR; INTRAMUSCULAR
Status: COMPLETED | OUTPATIENT
Start: 2024-10-16 | End: 2024-10-16

## 2024-10-16 RX ORDER — LIDOCAINE HYDROCHLORIDE 10 MG/ML
4 INJECTION, SOLUTION INFILTRATION; PERINEURAL
Status: COMPLETED | OUTPATIENT
Start: 2024-10-16 | End: 2024-10-16

## 2024-10-16 NOTE — PROGRESS NOTES
PRIMARY CARE PHYSICIAN: Duc Haney MD  REFERRING PROVIDER: No referring provider defined for this encounter.       SUBJECTIVE  CHIEF COMPLAINT:   Chief Complaint   Patient presents with    Left Knee - Injections    Right Knee - Injections        HPI: Nelson Hope is a pleasant 74 y.o. year-old male who is seen today for follow-up evaluation of bilateral knee pain.  He presents today for an intra-articular injection to the right knee.  The right knee has been bothering him more than the left.    REVIEW OF SYSTEMS  There has been no interval change in this patient's past medical, surgical, medications, allergies, family history or social history since the most recent visit to a provider within our department.  14 point review of systems was performed, reviewed, and negative except for pertinent positives documented in the history of present illness.    Past Medical History:   Diagnosis Date    Elevated prostate specific antigen (PSA)     Elevated prostate specific antigen (PSA)    Lateral epicondylitis, unspecified elbow 07/20/2013    Lateral epicondylitis    Pain in unspecified shoulder 07/20/2013    Pain, joint, shoulder    Personal history of malignant melanoma of skin     History of malignant melanoma    Sleep disorder, unspecified     Sleep disturbances        No Known Allergies     Past Surgical History:   Procedure Laterality Date    COLONOSCOPY  12/17/2019    Complete Colonoscopy    HERNIA REPAIR  03/02/2016    Hernia Repair    OTHER SURGICAL HISTORY  08/01/2014    Biopsy Skin    VASECTOMY  07/01/2015    Surgery Vas Deferens Vasectomy        Family History   Problem Relation Name Age of Onset    Lung cancer Mother      Other (bladder cancer) Father      Other (diabetes mellitus) Paternal Grandfather      Other (diabetes mellitus) Other          Social History     Socioeconomic History    Marital status:      Spouse name: Not on file    Number of children: Not on file    Years of education: Not on  file    Highest education level: Not on file   Occupational History    Not on file   Tobacco Use    Smoking status: Never    Smokeless tobacco: Never   Substance and Sexual Activity    Alcohol use: Not Currently    Drug use: Never    Sexual activity: Not on file   Other Topics Concern    Not on file   Social History Narrative    Not on file     Social Drivers of Health     Financial Resource Strain: Not on file   Food Insecurity: Not on file   Transportation Needs: Not on file   Physical Activity: Not on file   Stress: Not on file   Social Connections: Not on file   Intimate Partner Violence: Not on file   Housing Stability: Not on file        CURRENT MEDICATIONS:   Current Outpatient Medications   Medication Sig Dispense Refill    amphetamine-dextroamphetamine XR (Mydayis) 25 mg 24 hr capsule Take 1 capsule (25 mg) by mouth once daily.      aspirin 81 mg EC tablet Take 1 tablet (81 mg) by mouth.      chlorhexidine (Peridex) 0.12 % solution Take by mouth.      clotrimazole (Mycelex) 10 mg meliton Take by mouth.      docusate sodium (Colace) 100 mg capsule Take 1 capsule (100 mg) by mouth in the morning and 1 capsule (100 mg) before bedtime.      doxepin (SINEquan) 10 mg/mL solution Take 0.5 mL (5 mg) by mouth once daily at bedtime.      lidocaine (Xylocaine) 2 % solution Take by mouth.      meloxicam (Mobic) 15 mg tablet Take by mouth.      metFORMIN (Glucophage) 500 mg tablet Take 1 tablet (500 mg) by mouth once daily.      multivitamin tablet Take by mouth.      NON FORMULARY BMX solution (1:1:1 ratio) Sig: 10 ml swish gargle spit q. 4-6 hours PRN pain      polyethylene glycol-electrolytes 420 gram solution Take by mouth. MIX AND DRINK 4000 ML AS PER SPLIT DOSE  ENDOSCOPY INSTRUCTIONS      tamsulosin (Flomax) 0.4 mg 24 hr capsule Take 1 capsule (0.4 mg) by mouth once daily. 30 capsule 11    valACYclovir (Valtrex) 1 gram tablet Take 1 tablet (1,000 mg) by mouth once daily.       No current facility-administered  medications for this visit.        OBJECTIVE    PHYSICAL EXAM  There is no height or weight on file to calculate BMI.    General: Well-appearing male in no acute distress.  Awake, alert and oriented.  Pleasant and cooperative.  Respiratory: Non-labored breathing  Mood: Euthymic   Gait: Normal  Assistive Device: None     Affected Right Knee  Limb Alignment: Mild varus about 5 degrees  ROM: 5-120  Stable to varus and valgus stress at full extension and 30 degrees of flexion  Skin: Intact, no abrasions or draining sinuses  Effusion: None  Quad Strength: 5/5  Hamstring Strength: 5/5  Patella Crepitus: None  Patella Grind: Negative   Tenderness: None  Sensation: Intact to light touch distally  Motor function: Able to fire TA, EHL, G/S  Pulses: Palpable DP pulse    Unaffected Left Knee  Limb Alignment: Mild varus about 5 degrees  ROM: 5-120  Stable to varus and valgus stress at full extension and 30 degrees of flexion  Skin: Intact, no abrasions or draining sinuses  Effusion: None  Quad Strength: 5/5  Hamstring Strength: 5/5  Patella Crepitus: None  Patella Grind: Negative   Tenderness: None  Sensation: Intact to light touch distally  Motor function: Able to fire TA, EHL, G/S  Pulses: Palpable DP pulse    IMAGING:  AP / lateral/ mid-flexion/sunrise views: Independent review of right knee x-rays was performed. The findings were reviewed with the patient. There are mild degenerative changes of the right knee with varus limb alignment. There is not joint space narrowing, subchondral sclerosis, and osteophyte formation. No evidence of fracture, AVN, dislocation, osteomyelitis.        ASSESSMENT & PLAN    IMPRESSION:  Nelson Hope is a 74 y.o. male with mild osteoarthritis of both knees.    PLAN:  Patient ID: Nelson Hope is a 74 y.o. male.    L Inj/Asp: R knee on 10/16/2024 3:47 PM  Indications: pain and joint swelling  Details: 22 G needle, anterolateral approach  Medications: 1 mL triamcinolone acetonide 40 mg/mL; 4 mL  lidocaine 10 mg/mL (1 %)  Outcome: tolerated well, no immediate complications    Discussion:  I discussed the conservative treatment options for knee osteoarthritis including but not limited to physical therapy, oral NSAIDS, activity and lifestyle modification, and corticosteroid injections. Pt has elected to undergo a cortisone injection today. I have explained the risk and benefits of an injection including the possibility of joint infection, bleeding, damage to cartilage, allergic reaction. Patient verbalized understanding and gave verbal consent wishes to proceed with a intra-articular cortisone injection for their knee.    Procedure:  After discussing the risk and benefits of the procedure, we proceeded with an intra-articular right knee injection.    With the patient's informed verbal consent, the right knee was prepped in standard sterile fashion with Chlorhexidine. The skin was then anesthetized with ethyl chloride spray and cleaned again with Chlorhexidine. The knee was then apirated/injected with a prefilled 20-gauge syringe of 40 mg Kenalog + 4 ml Lidocaine using the lateral approach without complications.  The patient tolerated this well and felt immediate initial relief of symptoms. A bandaid was applied and the patient ambulated out of the clinic on ther own accord without difficulty. Patient was instructed to avoid physical activity for 24-48 hours to prevent the knees from swelling and may ice the knees as tolerated. Patient should contact the office if any signs of of infection appear: redness, fever, chills, drainage, swelling or warmth to the knees.  Pt understands that the injections can be repeated no sooner than 3 months.    Procedure, treatment alternatives, risks and benefits explained, specific risks discussed. Consent was given by the patient. Immediately prior to procedure a time out was called to verify the correct patient, procedure, equipment, support staff and site/side marked as  required. Patient was prepped and draped in the usual sterile fashion.           * This office note was dictated using Dragon voice to text software and was not proofread for spelling or grammatical errors *

## 2024-11-13 ENCOUNTER — APPOINTMENT (OUTPATIENT)
Dept: ORTHOPEDIC SURGERY | Facility: CLINIC | Age: 74
End: 2024-11-13
Payer: MEDICARE

## 2025-07-30 ENCOUNTER — APPOINTMENT (OUTPATIENT)
Dept: PRIMARY CARE | Facility: CLINIC | Age: 75
End: 2025-07-30
Payer: MEDICARE

## 2025-08-13 ENCOUNTER — APPOINTMENT (OUTPATIENT)
Dept: PRIMARY CARE | Facility: CLINIC | Age: 75
End: 2025-08-13
Payer: MEDICARE

## 2025-08-13 DIAGNOSIS — Z00.00 HEALTH CARE MAINTENANCE: Primary | ICD-10-CM

## 2025-08-13 DIAGNOSIS — E78.49 ESSENTIAL FAMILIAL HYPERLIPIDEMIA: ICD-10-CM

## 2025-08-13 DIAGNOSIS — N40.0 BENIGN PROSTATIC HYPERPLASIA WITHOUT LOWER URINARY TRACT SYMPTOMS: ICD-10-CM

## 2025-08-13 DIAGNOSIS — R73.02 GLUCOSE INTOLERANCE (IMPAIRED GLUCOSE TOLERANCE): ICD-10-CM

## 2025-08-13 PROCEDURE — 1170F FXNL STATUS ASSESSED: CPT | Performed by: INTERNAL MEDICINE

## 2025-08-13 PROCEDURE — G0439 PPPS, SUBSEQ VISIT: HCPCS | Performed by: INTERNAL MEDICINE

## 2025-08-13 PROCEDURE — 99213 OFFICE O/P EST LOW 20 MIN: CPT | Performed by: INTERNAL MEDICINE

## 2025-08-13 PROCEDURE — 1036F TOBACCO NON-USER: CPT | Performed by: INTERNAL MEDICINE

## 2025-08-13 PROCEDURE — 1160F RVW MEDS BY RX/DR IN RCRD: CPT | Performed by: INTERNAL MEDICINE

## 2025-08-13 PROCEDURE — 1159F MED LIST DOCD IN RCRD: CPT | Performed by: INTERNAL MEDICINE

## 2025-08-24 ASSESSMENT — ACTIVITIES OF DAILY LIVING (ADL)
BATHING: INDEPENDENT
DOING_HOUSEWORK: INDEPENDENT
TAKING_MEDICATION: INDEPENDENT
GROCERY_SHOPPING: INDEPENDENT
DRESSING: INDEPENDENT
MANAGING_FINANCES: INDEPENDENT

## 2025-08-24 ASSESSMENT — ENCOUNTER SYMPTOMS
LOSS OF SENSATION IN FEET: 0
DEPRESSION: 0
OCCASIONAL FEELINGS OF UNSTEADINESS: 0

## 2025-08-24 ASSESSMENT — PATIENT HEALTH QUESTIONNAIRE - PHQ9
2. FEELING DOWN, DEPRESSED OR HOPELESS: NOT AT ALL
1. LITTLE INTEREST OR PLEASURE IN DOING THINGS: NOT AT ALL
SUM OF ALL RESPONSES TO PHQ9 QUESTIONS 1 AND 2: 0